# Patient Record
Sex: FEMALE | Race: WHITE | Employment: OTHER | URBAN - METROPOLITAN AREA
[De-identification: names, ages, dates, MRNs, and addresses within clinical notes are randomized per-mention and may not be internally consistent; named-entity substitution may affect disease eponyms.]

---

## 2017-01-01 ENCOUNTER — TELEPHONE (OUTPATIENT)
Dept: NEPHROLOGY | Facility: CLINIC | Age: 82
End: 2017-01-01

## 2017-01-01 ENCOUNTER — APPOINTMENT (OUTPATIENT)
Dept: GENERAL RADIOLOGY | Facility: HOSPITAL | Age: 82
DRG: 291 | End: 2017-01-01
Attending: INTERNAL MEDICINE
Payer: MEDICARE

## 2017-01-01 ENCOUNTER — LAB ENCOUNTER (OUTPATIENT)
Dept: LAB | Age: 82
End: 2017-01-01
Attending: INTERNAL MEDICINE
Payer: MEDICARE

## 2017-01-01 ENCOUNTER — APPOINTMENT (OUTPATIENT)
Dept: CV DIAGNOSTICS | Facility: HOSPITAL | Age: 82
DRG: 291 | End: 2017-01-01
Attending: INTERNAL MEDICINE
Payer: MEDICARE

## 2017-01-01 ENCOUNTER — PRIOR ORIGINAL RECORDS (OUTPATIENT)
Dept: OTHER | Age: 82
End: 2017-01-01

## 2017-01-01 ENCOUNTER — TELEPHONE (OUTPATIENT)
Dept: CARDIOLOGY UNIT | Facility: HOSPITAL | Age: 82
End: 2017-01-01

## 2017-01-01 ENCOUNTER — HOSPITAL ENCOUNTER (EMERGENCY)
Facility: HOSPITAL | Age: 82
Discharge: STILL ADMITTED OR ADMINISTRATIVE | End: 2017-01-01
Payer: MEDICARE

## 2017-01-01 ENCOUNTER — HOSPITAL ENCOUNTER (INPATIENT)
Facility: HOSPITAL | Age: 82
LOS: 3 days | Discharge: HOME | DRG: 291 | End: 2017-01-01
Attending: INTERNAL MEDICINE | Admitting: INTERNAL MEDICINE
Payer: MEDICARE

## 2017-01-01 VITALS
HEIGHT: 59 IN | RESPIRATION RATE: 20 BRPM | DIASTOLIC BLOOD PRESSURE: 51 MMHG | TEMPERATURE: 98 F | HEART RATE: 84 BPM | WEIGHT: 105.38 LBS | BODY MASS INDEX: 21.24 KG/M2 | SYSTOLIC BLOOD PRESSURE: 162 MMHG | OXYGEN SATURATION: 96 %

## 2017-01-01 DIAGNOSIS — E78.00 HYPERCHOLESTEROLEMIA: Primary | ICD-10-CM

## 2017-01-01 DIAGNOSIS — I25.10 CAD (CORONARY ARTERY DISEASE): ICD-10-CM

## 2017-01-01 DIAGNOSIS — I50.32 CHRONIC DIASTOLIC HEART FAILURE (HCC): Primary | ICD-10-CM

## 2017-01-01 DIAGNOSIS — N18.3 CHRONIC KIDNEY DISEASE (CKD), STAGE 3 (MODERATE): Primary | ICD-10-CM

## 2017-01-01 DIAGNOSIS — I25.10 DISEASE OF CARDIOVASCULAR SYSTEM: ICD-10-CM

## 2017-01-01 LAB
ALT (SGPT): 21 U/L
ALT SERPL-CCNC: 21 U/L (ref 14–54)
ANION GAP SERPL CALC-SCNC: 10 MMOL/L (ref 0–18)
AST (SGOT): 39 U/L
AST SERPL-CCNC: 39 U/L (ref 15–41)
BUN SERPL-MCNC: 36 MG/DL (ref 8–20)
BUN/CREAT SERPL: 22.9 (ref 10–20)
BUN: 36 MG/DL
CALCIUM SERPL-MCNC: 10.3 MG/DL (ref 8.5–10.5)
CALCIUM: 10.3 MG/DL
CHLORIDE SERPL-SCNC: 102 MMOL/L (ref 95–110)
CHLORIDE: 102 MEQ/L
CHOLEST SERPL-MCNC: 91 MG/DL (ref 110–200)
CHOLESTEROL, TOTAL: 91 MG/DL
CK SERPL-CCNC: 194 U/L (ref 38–234)
CO2 SERPL-SCNC: 30 MMOL/L (ref 22–32)
CREAT SERPL-MCNC: 1.57 MG/DL (ref 0.5–1.5)
CREATININE KINASE: 194 U/L
CREATININE, SERUM: 1.57 MG/DL
GLUCOSE SERPL-MCNC: 87 MG/DL (ref 70–99)
GLUCOSE: 87 MG/DL
GLUCOSE: 87 MG/DL
HDL CHOLESTEROL: 17 MG/DL
HDLC SERPL-MCNC: 17 MG/DL
LDL CHOLESTEROL: 48 MG/DL
LDLC SERPL CALC-MCNC: 48 MG/DL (ref 0–99)
NONHDLC SERPL-MCNC: 74 MG/DL
OSMOLALITY UR CALC.SUM OF ELEC: 302 MOSM/KG (ref 275–295)
POTASSIUM SERPL-SCNC: 4.4 MMOL/L (ref 3.3–5.1)
POTASSIUM, SERUM: 4.4 MEQ/L
SGOT (AST): 39 IU/L
SGPT (ALT): 21 IU/L
SODIUM SERPL-SCNC: 142 MMOL/L (ref 136–144)
SODIUM: 142 MEQ/L
TRIGL SERPL-MCNC: 132 MG/DL (ref 1–149)
TRIGLYCERIDES: 132 MG/DL

## 2017-01-01 PROCEDURE — 71020 XR CHEST PA + LAT CHEST (CPT=71020): CPT | Performed by: RADIOLOGY

## 2017-01-01 PROCEDURE — 99233 SBSQ HOSP IP/OBS HIGH 50: CPT | Performed by: INTERNAL MEDICINE

## 2017-01-01 PROCEDURE — 36415 COLL VENOUS BLD VENIPUNCTURE: CPT

## 2017-01-01 PROCEDURE — 82550 ASSAY OF CK (CPK): CPT

## 2017-01-01 PROCEDURE — 80061 LIPID PANEL: CPT

## 2017-01-01 PROCEDURE — 93307 TTE W/O DOPPLER COMPLETE: CPT

## 2017-01-01 PROCEDURE — 93307 TTE W/O DOPPLER COMPLETE: CPT | Performed by: INTERNAL MEDICINE

## 2017-01-01 PROCEDURE — 80048 BASIC METABOLIC PNL TOTAL CA: CPT

## 2017-01-01 PROCEDURE — 99232 SBSQ HOSP IP/OBS MODERATE 35: CPT | Performed by: INTERNAL MEDICINE

## 2017-01-01 PROCEDURE — 99223 1ST HOSP IP/OBS HIGH 75: CPT | Performed by: INTERNAL MEDICINE

## 2017-01-01 PROCEDURE — 84450 TRANSFERASE (AST) (SGOT): CPT

## 2017-01-01 PROCEDURE — 84460 ALANINE AMINO (ALT) (SGPT): CPT

## 2017-01-01 RX ORDER — HYDRALAZINE HYDROCHLORIDE 20 MG/ML
10 INJECTION INTRAMUSCULAR; INTRAVENOUS EVERY 6 HOURS PRN
Status: DISCONTINUED | OUTPATIENT
Start: 2017-01-01 | End: 2017-01-01

## 2017-01-01 RX ORDER — PANTOPRAZOLE SODIUM 40 MG/1
40 TABLET, DELAYED RELEASE ORAL
Status: DISCONTINUED | OUTPATIENT
Start: 2017-01-01 | End: 2017-01-01

## 2017-01-01 RX ORDER — HYDRALAZINE HYDROCHLORIDE 50 MG/1
50 TABLET, FILM COATED ORAL EVERY 6 HOURS SCHEDULED
Qty: 100 TABLET | Refills: 3 | Status: SHIPPED | OUTPATIENT
Start: 2017-01-01

## 2017-01-01 RX ORDER — CARVEDILOL 25 MG/1
25 TABLET ORAL 2 TIMES DAILY
Status: DISCONTINUED | OUTPATIENT
Start: 2017-01-01 | End: 2017-01-01

## 2017-01-01 RX ORDER — LATANOPROST 50 UG/ML
1 SOLUTION/ DROPS OPHTHALMIC DAILY
Status: DISCONTINUED | OUTPATIENT
Start: 2017-01-01 | End: 2017-01-01

## 2017-01-01 RX ORDER — FENOFIBRATE 67 MG/1
67 CAPSULE ORAL
Status: DISCONTINUED | OUTPATIENT
Start: 2017-01-01 | End: 2017-01-01

## 2017-01-01 RX ORDER — LATANOPROST 50 UG/ML
1 SOLUTION/ DROPS OPHTHALMIC NIGHTLY
Status: DISCONTINUED | OUTPATIENT
Start: 2017-01-01 | End: 2017-01-01

## 2017-01-01 RX ORDER — FUROSEMIDE 10 MG/ML
40 INJECTION INTRAMUSCULAR; INTRAVENOUS
Status: DISCONTINUED | OUTPATIENT
Start: 2017-01-01 | End: 2017-01-01

## 2017-01-01 RX ORDER — DOCUSATE SODIUM 100 MG/1
100 CAPSULE, LIQUID FILLED ORAL DAILY
Status: DISCONTINUED | OUTPATIENT
Start: 2017-01-01 | End: 2017-01-01

## 2017-01-01 RX ORDER — POLYVINYL ALCOHOL 14 MG/ML
1-2 SOLUTION/ DROPS OPHTHALMIC 4 TIMES DAILY PRN
Status: DISCONTINUED | OUTPATIENT
Start: 2017-01-01 | End: 2017-01-01

## 2017-01-01 RX ORDER — FUROSEMIDE 40 MG/1
40 TABLET ORAL
Status: DISCONTINUED | OUTPATIENT
Start: 2017-01-01 | End: 2017-01-01

## 2017-01-01 RX ORDER — ASPIRIN 81 MG/1
81 TABLET ORAL
Status: DISCONTINUED | OUTPATIENT
Start: 2017-01-01 | End: 2017-01-01

## 2017-01-01 RX ORDER — ZOLPIDEM TARTRATE 5 MG/1
5 TABLET ORAL NIGHTLY PRN
Status: DISCONTINUED | OUTPATIENT
Start: 2017-01-01 | End: 2017-01-01

## 2017-01-01 RX ORDER — SODIUM CHLORIDE 0.9 % (FLUSH) 0.9 %
10 SYRINGE (ML) INJECTION AS NEEDED
Status: DISCONTINUED | OUTPATIENT
Start: 2017-01-01 | End: 2017-01-01

## 2017-01-01 RX ORDER — POTASSIUM CHLORIDE 20 MEQ/1
40 TABLET, EXTENDED RELEASE ORAL EVERY 4 HOURS
Status: COMPLETED | OUTPATIENT
Start: 2017-01-01 | End: 2017-01-01

## 2017-01-01 RX ORDER — FUROSEMIDE 10 MG/ML
40 INJECTION INTRAMUSCULAR; INTRAVENOUS 2 TIMES DAILY
Status: DISCONTINUED | OUTPATIENT
Start: 2017-01-01 | End: 2017-01-01

## 2017-01-01 RX ORDER — ATORVASTATIN CALCIUM 10 MG/1
10 TABLET, FILM COATED ORAL NIGHTLY
Status: DISCONTINUED | OUTPATIENT
Start: 2017-01-01 | End: 2017-01-01

## 2017-01-01 RX ORDER — FUROSEMIDE 40 MG/1
40 TABLET ORAL
Qty: 200 TABLET | Refills: 3 | Status: SHIPPED | OUTPATIENT
Start: 2017-01-01 | End: 2017-01-01

## 2017-01-01 RX ORDER — HYDROCODONE BITARTRATE AND ACETAMINOPHEN 7.5; 325 MG/1; MG/1
1 TABLET ORAL EVERY 4 HOURS PRN
Status: DISCONTINUED | OUTPATIENT
Start: 2017-01-01 | End: 2017-01-01

## 2017-01-01 RX ORDER — NITROGLYCERIN 0.4 MG/1
0.4 TABLET SUBLINGUAL EVERY 5 MIN PRN
Status: DISCONTINUED | OUTPATIENT
Start: 2017-01-01 | End: 2017-01-01

## 2017-01-01 RX ORDER — ISOSORBIDE DINITRATE 30 MG/1
30 TABLET ORAL
Qty: 100 TABLET | Refills: 3 | Status: SHIPPED | OUTPATIENT
Start: 2017-01-01

## 2017-01-01 RX ORDER — DOCUSATE SODIUM 100 MG/1
100 CAPSULE, LIQUID FILLED ORAL NIGHTLY
Status: DISCONTINUED | OUTPATIENT
Start: 2017-01-01 | End: 2017-01-01

## 2017-01-01 RX ORDER — ISOSORBIDE DINITRATE 20 MG/1
20 TABLET ORAL
Status: DISCONTINUED | OUTPATIENT
Start: 2017-01-01 | End: 2017-01-01

## 2017-01-01 RX ORDER — HYDRALAZINE HYDROCHLORIDE 50 MG/1
50 TABLET, FILM COATED ORAL EVERY 6 HOURS SCHEDULED
Status: DISCONTINUED | OUTPATIENT
Start: 2017-01-01 | End: 2017-01-01

## 2017-01-01 RX ORDER — CLOPIDOGREL BISULFATE 75 MG/1
75 TABLET ORAL DAILY
Status: DISCONTINUED | OUTPATIENT
Start: 2017-01-01 | End: 2017-01-01

## 2017-01-01 RX ORDER — FENOFIBRATE 134 MG/1
134 CAPSULE ORAL
Status: DISCONTINUED | OUTPATIENT
Start: 2017-01-01 | End: 2017-01-01

## 2017-01-01 RX ORDER — HYDRALAZINE HYDROCHLORIDE 50 MG/1
50 TABLET, FILM COATED ORAL EVERY 8 HOURS SCHEDULED
Status: DISCONTINUED | OUTPATIENT
Start: 2017-01-01 | End: 2017-01-01

## 2017-01-01 RX ORDER — HYDRALAZINE HYDROCHLORIDE 25 MG/1
25 TABLET, FILM COATED ORAL EVERY 8 HOURS SCHEDULED
Status: DISCONTINUED | OUTPATIENT
Start: 2017-01-01 | End: 2017-01-01

## 2017-01-01 RX ORDER — ONDANSETRON 4 MG/1
4 TABLET, ORALLY DISINTEGRATING ORAL EVERY 4 HOURS PRN
Status: DISCONTINUED | OUTPATIENT
Start: 2017-01-01 | End: 2017-01-01

## 2017-04-04 NOTE — CONSULTS
[facility]    Cardiology Consultation    Randy Trammell Location: [address]    1931 MRN [id number]   Consulting Date 2017 [facility] [id number]   Consulting Physician Robert Sims MD Attending Physician Bebeto Lopes., Pamella Ramires DAILY. Disp: 30 tablet Rfl: 3   FERROUS SULFATE 325 (65 Fe) MG Oral Tab TAKE 1 TABLET (325 MG TOTAL) BY MOUTH DAILY WITH BREAKFAST. Disp: 90 tablet Rfl: 3   AMLODIPINE BESYLATE 5 MG Oral Tab TAKE 1 TABLET (5 MG TOTAL) BY MOUTH DAILY.  Disp: 90 tablet Rfl: 2 DAILY Disp:  Rfl:    RESTASIS 0.05 % OP EMUL TWICE DAILY (1 DROP EACH EYE) Disp:  Rfl:      Review of Systems:  GENERAL: no fevers, no weight change  HEENT: no visual or hearing changes  SKIN: denies any unusual skin lesions or rashes  RESPIRATORY: + short

## 2017-04-04 NOTE — HISTORICAL OFFICE NOTE
Osminjorge Sunil  : 1931  ACCOUNT:  [id number]  [phone]  PCP: Dr. Levora Opitz     TODAY'S DATE: 2017  DICTATED BY:  Maryjane Spencer M.D.]    CHIEF COMPLAINT: [Followup of .  CAD, of native vessels.]    HPI: [On 2017, mili Levy 05/06/2015-cardiac cath-PTCA/AMADOR of vein graft to LAD within the previously stent, 2/04  PTA/stent right renal artery, 2/04 rt renal Artery, CABG 1992, CAD, hypertension, hyperlipidemia, mitral regurgitation, tricuspid regurgitation, cardiac catheterizatio deferred. PEDAL: pedal pulses intact. EXT: trace pedal/ankle edema bilaterally. DECISION MAKING: Coronary artery disease, 25 years post bypass, stable and asymptomatic with excellent risk factor control. Bilateral carotid bruits. ASSESSMENT:  1. .  C 06/16/15 Norco                 7.5-325M  as needed                                06/16/15 Vitamin B-12          1000MCG   1 daily                                  06/16/15 Xalatan               0.005%    as directed                              05/15/1

## 2017-04-05 NOTE — PLAN OF CARE
Problem: CARDIOVASCULAR - ADULT  Goal: Absence of cardiac arrhythmias or at baseline  INTERVENTIONS:  - Continuous cardiac monitoring, monitor vital signs, obtain 12 lead EKG if indicated  - Evaluate effectiveness of antiarrhythmic and heart rate control m labs and assess for signs and symptoms of volume excess or deficit  - Monitor intake, output and patient weight  - Monitor response to interventions for patient’s volume status, including labs, urine output, blood pressure (other measures as available)  -

## 2017-04-05 NOTE — PROGRESS NOTES
[facility] AND [facility]  Progress Note    Lyndsey Come Patient Status:  Inpatient    1931 MRN [id number]   Location [facility] 3W/SW Attending Brian Livingston., Vida Lissettcorrie, *   Hosp Day # 1 PCP Kaylee Hedrick MD     Assessment:    1.  Bivent ALB 3.2 04/04/2017       Lab Results  Component Value Date   LDL 35 04/05/2017   HDL 18 04/05/2017   TRIG 99 04/05/2017       Lab Results  Component Value Date   TROP 0.032 09/13/2016        Medications:    • furosemide  40 mg Oral BID (Diuretic)   • fen

## 2017-04-05 NOTE — H&P
[facility]    PATIENT'S NAME: Billy Jeanrashad   ATTENDING PHYSICIAN: Mike Haines MD   PATIENT ACCOUNT#:   [id number]    LOCATION:  [facility]  MEDICAL RECORD #:   [id number]       YOB: 1931  ADMISSION DATE:       04/04/2017 decreased distally. Toes are downgoing. Gait was not tested. NEUROLOGIC:  Cranial nerves are intact. Judgment, intellect, orientation are all appropriate. There is no focal sensorimotor deficit.     LABORATORY DATA:  Her proBNP level is greater than 35

## 2017-04-05 NOTE — PLAN OF CARE
Problem: Patient/Family Goals  Goal: Patient/Family Long Term Goal  Patient’s Long Term Goal: shortness of breath resolved, able to resume ADL’s without difficulty    Interventions:  Lasix as ordered, vs monitoring, fluid restriction, I/O, daily weights  -

## 2017-04-05 NOTE — PLAN OF CARE
CARDIOVASCULAR - ADULT    • Absence of cardiac arrhythmias or at baseline Progressing          Patient Centered Care    • Patient preferences are identified and integrated in the patient's plan of care Progressing          RESPIRATORY - ADULT    • Achieves

## 2017-04-05 NOTE — PROGRESS NOTES
[facility]    Progress Note    Americaquentin Almendarez Patient Status:  Inpatient    1931 MRN [id number]   Location [facility] 1SW Attending J Luis Luna., Michael Newton, *   Hosp Day # 1 PCP Og Morse MD       SUBJECTIVE: Daily   Atorvastatin Calcium (LIPITOR) tab 10 mg 10 mg Oral Nightly   hydrocodone-acetaminophen (NORCO) 7.5-325 MG per tab 1 tablet 1 tablet Oral Q4H PRN   ondansetron (ZOFRAN-ODT) disintegrating tab 4 mg 4 mg Oral Q4H PRN   Pantoprazole Sodium (PROTONIX)

## 2017-04-05 NOTE — PLAN OF CARE
Dr. Karo Howell notified Ayala Fraction called during the night that pt. has a new left bundle branch block--QRS flips from . 08 sec to 14 seconds; pt. Was asymptomatic at the time. No new orders except to let Cardiology know when they arrive to see pt.   (Dr. Kenton Hathaway note

## 2017-04-05 NOTE — DISCHARGE PLANNING
4/5CM-MD orders received in regards to discharge planning. The Patient and her daughter Leroy Hamilton ([phone]) was seen at bedside. The Patient resides alone in [facility] [place] in a elevator condominium building.    Prior to hospitalization, the Patient was using

## 2017-04-05 NOTE — CONSULTS
Pulmonary Consult     Assessment / Plan:  1. Dyspnea: overall picture is concerning for HFpEF exacerbation. May have concomitant viral infection as well.  It is unlikely she has bacterial PNA (no fever, no leukocytosis, PCT is indeterminate, CXR with no foc Comment Carotid artery surgery, 06/2009    CABG      TOTAL HIP REPLACEMENT      FRACTURE SURGERY           Prescriptions prior to admission:  ISOSORBIDE MONONITRATE ER 30 MG Oral Tablet 24 Hr TAKE 1 TABLET (30 MG TOTAL) BY MOUTH DAILY.  Disp: 30 tablet R Tab  Disp:  Rfl: 11 4/4/2017 at Unknown time   carvedilol (COREG) 25 MG Oral Tab Take 1 tablet by mouth 2 (two) times daily.  Disp: 180 tablet Rfl: 3 4/4/2017 at Unknown time   Acetaminophen (TYLENOL ARTHRITIS EXT RELIEF OR) Take 1,000 mg by mouth daily as Disp: 30 tablet Rfl: 6   docusate sodium 100 MG Oral Cap Take 100 mg by mouth daily.  Disp:  Rfl:    methocarbamol (ROBAXIN) 500 MG Oral Tab TAKE ONE TABLET BY MOUTH FOUR TIMES A DAY (Patient taking differently: Take 500 mg by mouth 4 (four) times daily as 94%       General: NAD  HEENT: OP clear  Neck: no cervical or supraclavicular lymphadenopathy  Respiratory: clear breath sounds bilaterally  Cardiovascular: RRR, no MRG  Abdo: soft, NTND  Musculoskeletal: no clubbing, no edema  Skin: no rashes  Mental stat

## 2017-04-06 NOTE — PROGRESS NOTES
Pt seen, wellknow to me over the years. . Doing ok. HAS SOME FLUID OVERLOAD. AGREE W CARE PLAN PER DR IBARRA. LASIX BID  SLOW DIURESIS, TRY TO LOSE 4-5 LBS OVER NEXT FEWDAYS. Bj Wright RENAL FX NEAR BASELINE. CHECK LAB S IN AM, URINE LYTES ETC. WILL FOLLOW W YOU. Bj Wright

## 2017-04-06 NOTE — PROGRESS NOTES
[facility]    Progress Note    Lyndsey Come Patient Status:  Inpatient    1931 MRN [id number]   Location [facility] 3W/SW Attending Brian Livingston., Vida Mott, *   Hosp Day # 2 PCP Claudean Palmer, MD       Subjective: Polyvinyl Alcohol (LIQUI-TEARS) 1.4 % ophthalmic solution 1-2 drop 1-2 drop Both Eyes QID PRN   Atropine Sulfate 0.1 MG/ML injection 0.5 mg 0.5 mg Intravenous PRN   nitroGLYCERIN (NITROSTAT) SL tab 0.4 mg 0.4 mg Sublingual Q5 Min PRN   Normal Saline Flus disease (CKD)    Continues to diureses. Monitor Kidney function. Notes read from Renal service  Cardiology notes read. Family at bedside. Discussed her fall and facial contusion. They want her to have the ambien at night due to insomnia.

## 2017-04-06 NOTE — PLAN OF CARE
Patient transferred from [facility]. BP elevated. Blanche ECHAVARRIA notified. Seen by Rossy Levine . Hydralazine and Imdur ordered.  Up with assist.

## 2017-04-06 NOTE — PROGRESS NOTES
Pulmonary Progress Note      NAME: [facility] - ROOM: 373/590-P - MRN: [id number] - Age: 80year old - : 1931    Assessment/Plan:  1. Dyspnea: overall picture is concerning for HFpEF exacerbation.  May have concomitant viral infection as well non-distended, positive BS.    Extremity: +edema    Recent Labs   Lab  04/06/17   0537   RBC  3.13*   HGB  10.1*   HCT  30.2*   MCV  96.5   MCH  32.4*   MCHC  33.5   RDW  13.5   WBC  6.6   PLT  109*     Recent Labs   Lab  04/04/17   1850  04/05/17   0623  0

## 2017-04-06 NOTE — PLAN OF CARE
CARDIOVASCULAR - ADULT    • Absence of cardiac arrhythmias or at baseline Progressing        METABOLIC/FLUID AND ELECTROLYTES - ADULT    • Electrolytes maintained within normal limits Progressing    • Hemodynamic stability and optimal renal function mainta

## 2017-04-06 NOTE — PROGRESS NOTES
[facility]    Cardiology - [facility]  Progress Note    Ana Maria Penny Patient Status:  Inpatient    1931 MRN [id number]   Location [facility] 3W/SW Attending Mare Karimi., Colby Mendoza, *   Hosp Day # 2 PCP Michelle Gleason, Brooklyn Roland  04/06/2017   CO2 27 04/06/2017   * 04/06/2017   CA 9.9 04/06/2017   ALB 3.2* 04/04/2017   ALKPHO 54 04/04/2017   BILT 1.7* 04/04/2017   TP 5.9 04/04/2017   AST 29 04/04/2017   ALT 16 04/04/2017   TSH 2.544 04/05/2016   DDIMER 1.04* 04/04/20

## 2017-04-07 NOTE — DISCHARGE PLANNING
4/7/17 CM Discharge planning   Met with pt and dtr at bedside. Discharge planning discussed, pt plans to return home to her apt at discharge. Pt has declined short term rehab or [place] [address] services. Supportive dtr lives close by and able to assist as needed.

## 2017-04-07 NOTE — PROGRESS NOTES
[facility]    Cardiology - [facility]  Progress Note    Radha Perez Patient Status:  Inpatient    1931 MRN [id number]   Location [facility] 3W/SW Attending Low Dudley, Nini Monroe, *   Hosp Day # 3 PCP Regino Quiroz    Extremities: Edema trivial ankle bilat. Peripheral pulses are 2+. Skin: Warm and dry.     Results:     Lab Results  Component Value Date   WBC 5.4 04/07/2017   HGB 10.6* 04/07/2017   HCT 31.5* 04/07/2017   * 04/07/2017   CREATSERUM 1.66* 04/07/20

## 2017-04-07 NOTE — PROGRESS NOTES
[facility]    Progress Note    Pankaj Clarity Patient Status:  Inpatient    1931 MRN [id number]   Location [facility] 3W/SW Attending Selvin Rodriguez, Reshma Gould, *   Hosp Day # 3 PCP Jessica Velarde MD       Subjective: noted  Back/Spine: no abnormalities noted  Musculoskeletal: full ROM all extremities good strength  no deformities  Extremities: no edema, cyanosis  Neurological:  Grossly normal    Results:     Laboratory Data:    Lab Results  Component Value Date   WBC 5

## 2017-04-07 NOTE — PROGRESS NOTES
[facility]    Progress Note    Wilder Joy Patient Status:  Inpatient    1931 MRN [id number]   Location [facility] 3W/SW Attending Garima Chacon., Tracy Oden, *   Hosp Day # 3 PCP Bettye Bailey, Rosemary Rodriguez MD       SUBJECTIV 75 mg 75 mg Oral Daily   Atorvastatin Calcium (LIPITOR) tab 10 mg 10 mg Oral Nightly   hydrocodone-acetaminophen (NORCO) 7.5-325 MG per tab 1 tablet 1 tablet Oral Q4H PRN   ondansetron (ZOFRAN-ODT) disintegrating tab 4 mg 4 mg Oral Q4H PRN   Pantoprazole S

## 2017-04-07 NOTE — PLAN OF CARE
CARDIOVASCULAR - ADULT    • Absence of cardiac arrhythmias or at baseline Completed        METABOLIC/FLUID AND ELECTROLYTES - ADULT    • Electrolytes maintained within normal limits Completed    • Hemodynamic stability and optimal renal function maintained

## 2017-04-07 NOTE — PROGRESS NOTES
[facility]    Progress Note    Marlena Lorraine Patient Status:  Inpatient    1931 MRN [id number]   Location [facility] 3W/SW Attending Oniel Ford., Yolande Gonzalez, *   Hosp Day # 2 PCP Ebony Reza MD       Subjective: 1+ edema  Neurological:  Grossly normal    Results:     Laboratory Data:    Lab Results  Component Value Date   WBC 6.6 04/06/2017   HGB 10.1* 04/06/2017   HCT 30.2* 04/06/2017   * 04/06/2017   CREATSERUM 1.66* 04/06/2017   BUN 37* 04/06/2017   NA 1

## 2017-04-07 NOTE — PROGRESS NOTES
Pulmonary Progress Note     Assessment / Plan:  1. Dyspnea: overall picture is concerning for HFpEF exacerbation. May have concomitant viral infection as well.  It is unlikely she has bacterial PNA (no fever, no leukocytosis, PCT is indeterminate, CXR with

## 2017-04-07 NOTE — PHYSICAL THERAPY NOTE
PHYSICAL THERAPY EVALUATION - INPATIENT     Room Number: 337/337-A  Evaluation Date: 4/7/2017  Type of Evaluation: Initial  Physician Order: PT Eval and Treat    Presenting Problem: Chronic Kidney Dse  Reason for Therapy: Mobility Dysfunction and Disch • High cholesterol    • Coronary atherosclerosis    • Congestive heart disease ([facility] [address] 4/4/17   • Renal disorder      chronic renal insufficiency   • Back problem      spinal stenosis       Past Surgical History      Past Surgical History    CHOLECYSTECTOM sitting on the side of the bed?: None   How much help from another person does the patient currently need. ..   -   Moving to and from a bed to a chair (including a wheelchair)?: None   -   Need to walk in hospital room?: None   -   Climbing 3-5 steps with

## 2017-04-08 NOTE — DISCHARGE SUMMARY
[facility]    PATIENT'S NAME: Arlet Galdamez   ATTENDING PHYSICIAN: Paulette Mitchell MD   PATIENT ACCOUNT#:   [id number]    LOCATION:  [address] [place] [address] #:   [id number]       YOB: 1931  ADMISSION DATE:       04/04/2017 mitral regurgitation and severe tricuspid regurgitation with diastolic dysfunction, reduced ejection fraction of 35%, and elevated right ventricular pressures. Severe PA peak pressure of 101. DISCHARGE INSTRUCTIONS:  Cardiac diet.   See med reconciliati

## 2017-05-01 PROBLEM — I50.42 CHRONIC COMBINED SYSTOLIC AND DIASTOLIC CONGESTIVE HEART FAILURE (HCC): Status: ACTIVE | Noted: 2017-01-01

## 2017-05-01 PROBLEM — I13.0 CARDIORENAL SYNDROME, STAGE 1-4 OR UNSPECIFIED CHRONIC KIDNEY DISEASE, WITH HEART FAILURE (HCC): Status: ACTIVE | Noted: 2017-01-01

## 2019-03-01 VITALS
OXYGEN SATURATION: 96 % | HEART RATE: 87 BPM | WEIGHT: 104 LBS | BODY MASS INDEX: 20.96 KG/M2 | RESPIRATION RATE: 18 BRPM | DIASTOLIC BLOOD PRESSURE: 60 MMHG | SYSTOLIC BLOOD PRESSURE: 110 MMHG | HEIGHT: 59 IN

## 2020-12-30 ENCOUNTER — TELEPHONE (OUTPATIENT)
Dept: INTERNAL MEDICINE | Age: 85
End: 2020-12-30

## (undated) NOTE — IP AVS SNAPSHOT
[address]  [facility] ~ [phone]                Discharge Summary   4/4/2017    Kaz Maradiaga           Admission Information        Provider Department    4/4/2017 Meli Dejesus MD E What changed:  See the new instructions. Next dose due:  4/8/17 in the morning        TAKE 1 TABLET BY MOUTH ONCE DAILY.     Adryan iMn., Joni Nam                           furosemide 40 MG Tabs   Last time this was given:  40 mg on 4/7/2017  8:52 AM   C Generic drug:  Fluvastatin Sodium ER   Next dose due:  4/7/17 in the evening        1 TABLET DAILY AT BEDTIME                            Losartan Potassium 50 MG Tabs   Commonly known as:  COZAAR                              NITROSTAT 0.4 MG Subl   Generic Check BMP at that time, and bring BP and weight log to that appt    Follow-up Information     Follow up with Brenda Dawson., Kyle Rogders MD.    Specialties:  Internal Medicine, PEDIATRICS    Why:  PATIENT WILL SCHEDULE DIABETES FOLLOW-UP APPOINT 11.9 (H)    (04/06/17)  6.6 (04/06/17)  3.13 (L) (04/06/17)  10.1 (L) (04/06/17)  30.2 (L) (04/06/17)  96.5    (04/06/17)  109 (L) (04/06/17)  11.2 (H)    (04/04/17)  9.09 (04/04/17)  3.22 (L) (04/04/17)  10.2 (L) (04/04/17)  32.2 (L) (04/04/17)  100.0 - If you have concerns related to behavioral health issues or thoughts of harming yourself, contact [address] [place] [address] at [phone].     - If you don’t have insurance, Ileana Soriano has partnered with [facility] [place] [facility] Most common side effects: Dizziness or feeling lightheaded (especially with standing), heart rate changes, headaches, nausea/vomiting   What to report to your healthcare team:  Dizziness, nausea, chest pain, weakness, numbness           Water Pills     fur Most common side effects: Stomach upset   What to report to your healthcare team: Stomach upset, unresolved pain           GI Medications     PANTOPRAZOLE SODIUM 40 MG Oral Tab EC    docusate sodium 100 MG Oral Cap       Use:  Nausea/vomiting, acid reflux,